# Patient Record
Sex: MALE | Race: WHITE | HISPANIC OR LATINO | URBAN - METROPOLITAN AREA
[De-identification: names, ages, dates, MRNs, and addresses within clinical notes are randomized per-mention and may not be internally consistent; named-entity substitution may affect disease eponyms.]

---

## 2017-01-19 ENCOUNTER — EMERGENCY (EMERGENCY)
Facility: HOSPITAL | Age: 22
LOS: 1 days | Discharge: HOME | End: 2017-01-19
Attending: EMERGENCY MEDICINE
Payer: COMMERCIAL

## 2017-01-19 VITALS
SYSTOLIC BLOOD PRESSURE: 143 MMHG | WEIGHT: 179.9 LBS | TEMPERATURE: 100 F | HEIGHT: 66 IN | OXYGEN SATURATION: 97 % | RESPIRATION RATE: 17 BRPM | HEART RATE: 114 BPM | DIASTOLIC BLOOD PRESSURE: 79 MMHG

## 2017-01-19 VITALS
HEART RATE: 80 BPM | RESPIRATION RATE: 19 BRPM | DIASTOLIC BLOOD PRESSURE: 49 MMHG | SYSTOLIC BLOOD PRESSURE: 111 MMHG | OXYGEN SATURATION: 100 % | TEMPERATURE: 100 F

## 2017-01-19 DIAGNOSIS — R51 HEADACHE: ICD-10-CM

## 2017-01-19 PROCEDURE — 99284 EMERGENCY DEPT VISIT MOD MDM: CPT

## 2017-01-19 RX ORDER — METOCLOPRAMIDE HCL 10 MG
10 TABLET ORAL ONCE
Qty: 0 | Refills: 0 | Status: COMPLETED | OUTPATIENT
Start: 2017-01-19 | End: 2017-01-19

## 2017-01-19 RX ORDER — ACETAMINOPHEN 500 MG
975 TABLET ORAL ONCE
Qty: 0 | Refills: 0 | Status: COMPLETED | OUTPATIENT
Start: 2017-01-19 | End: 2017-01-19

## 2017-01-19 RX ORDER — SODIUM CHLORIDE 9 MG/ML
3 INJECTION INTRAMUSCULAR; INTRAVENOUS; SUBCUTANEOUS EVERY 8 HOURS
Qty: 0 | Refills: 0 | Status: DISCONTINUED | OUTPATIENT
Start: 2017-01-19 | End: 2017-01-23

## 2017-01-19 RX ORDER — DIPHENHYDRAMINE HCL 50 MG
25 CAPSULE ORAL ONCE
Qty: 0 | Refills: 0 | Status: COMPLETED | OUTPATIENT
Start: 2017-01-19 | End: 2017-01-19

## 2017-01-19 RX ORDER — SODIUM CHLORIDE 9 MG/ML
1000 INJECTION INTRAMUSCULAR; INTRAVENOUS; SUBCUTANEOUS ONCE
Qty: 0 | Refills: 0 | Status: COMPLETED | OUTPATIENT
Start: 2017-01-19 | End: 2017-01-19

## 2017-01-19 RX ORDER — IBUPROFEN 200 MG
1 TABLET ORAL
Qty: 12 | Refills: 0 | OUTPATIENT
Start: 2017-01-19

## 2017-01-19 RX ADMIN — Medication 25 MILLIGRAM(S): at 12:39

## 2017-01-19 RX ADMIN — SODIUM CHLORIDE 3 MILLILITER(S): 9 INJECTION INTRAMUSCULAR; INTRAVENOUS; SUBCUTANEOUS at 14:16

## 2017-01-19 RX ADMIN — Medication 10 MILLIGRAM(S): at 12:40

## 2017-01-19 RX ADMIN — Medication 975 MILLIGRAM(S): at 12:38

## 2017-01-19 RX ADMIN — SODIUM CHLORIDE 2000 MILLILITER(S): 9 INJECTION INTRAMUSCULAR; INTRAVENOUS; SUBCUTANEOUS at 12:39

## 2017-01-19 NOTE — ED ADULT NURSE REASSESSMENT NOTE - NS ED NURSE REASSESS COMMENT FT1
patient A&Ox3 in no acute distress denied any headache or other pain at this time , discharge as orders heplock remove left ER self ambulated with family ,Md aware patient Temp 100 oral, as per Dr Ling patient OK to be discharge home

## 2017-01-19 NOTE — ED PROVIDER NOTE - MEDICAL DECISION MAKING DETAILS
Pt well appearing with normal vitals and no meningismus.  Pt given reglan, benadryl, NS, tylenol.   Further w/u not currently indicated in ED.  f/u PMD.  Told to return immediately for worsening symptoms.

## 2017-01-19 NOTE — ED PROVIDER NOTE - PHYSICAL EXAMINATION
Gen: Alert, NAD, well appearing                  Head: NC, AT, PERRL, EOMI, normal lids/conjunctiva               ENT: normal hearing, patent oropharynx without erythema/exudate, uvula midline, moist mucosa                         Neck: supple, no tenderness/meningismus/JVD, trachea midline                   Pulm: Bilateral BS, normal resp effort, no wheeze/stridor/retractions/rales/rhonchi                      CV: RRR, no M/R/G, good dist pulses                Abd: soft, NT/ND, no hepatosplenomegaly                   Mskel: no edema/erythema/cyanosis                Skin: no rash                 Neuro: AAOx3, no sensory/motor deficits, negative Kernig and Brudzinsky, no meningismus, no photophobia                      Back: No tenderness

## 2017-01-19 NOTE — ED ADULT NURSE NOTE - OBJECTIVE STATEMENT
Pt c/o headache, neck pain exacerbated by downward motion, nausea, vomiting, dizziness since sunday became worse last night. sent by pmd to ED.

## 2017-01-19 NOTE — ED ADULT TRIAGE NOTE - CHIEF COMPLAINT QUOTE
Intermittent headache since Sunday with n/v. Pt denies any abdominal pain. Pt c/o fever with stiff neck

## 2017-01-19 NOTE — ED PROVIDER NOTE - OBJECTIVE STATEMENT
Pertinent PMH/PSH/FHx/SHx and Review of Systems contained within:    22 y/o M with no PMH c/o HA, dizziness, nausea, sensitivity to light and low grade fevers for 4 days.  No other complaints.  No sick contacts.  No neck stiffness.    No chills, No eye pain/changes in vision, No ear pain/sore throat/dysphagia, No chest pain/palpitations, No SOB/cough/wheeze/stridor, No abdominal pain, No V/D, No dysuria/frequency/discharge, No neck/back pain, No rash, No lower extremity edema, No changes in neurological status/function.    No other pertinent PMH.  No other pertinent PSH.  No other pertinent FHx.  Patient denies EtOH/tobacco/illicit substance use.

## 2022-06-08 NOTE — ED ADULT NURSE NOTE - CAS EDN DISCHARGE ASSESSMENT
Asc Procedure Text (A): After obtaining clear surgical margins the patient was sent to an ASC for surgical repair.  The patient understands they will receive post-surgical care and follow-up from the ASC physician. Alert and oriented to person, place and time